# Patient Record
Sex: FEMALE | Race: WHITE | NOT HISPANIC OR LATINO | URBAN - METROPOLITAN AREA
[De-identification: names, ages, dates, MRNs, and addresses within clinical notes are randomized per-mention and may not be internally consistent; named-entity substitution may affect disease eponyms.]

---

## 2020-07-25 ENCOUNTER — TELEPHONE ENCOUNTER (OUTPATIENT)
Dept: URBAN - METROPOLITAN AREA CLINIC 13 | Facility: CLINIC | Age: 75
End: 2020-07-25

## 2020-07-25 RX ORDER — POLYETHYLENE GLYCOL 3350, SODIUM CHLORIDE, SODIUM BICARBONATE AND POTASSIUM CHLORIDE WITH LEMON FLAVOR 420; 11.2; 5.72; 1.48 G/4L; G/4L; G/4L; G/4L
DRINK 32 OUNCES AT 5PM DAY BEFORE PROCEDURE AND 6 HOURS PRIOR POWDER, FOR SOLUTION ORAL
Qty: 1 | Refills: 0 | OUTPATIENT
Start: 2017-10-19 | End: 2018-02-26

## 2020-07-26 ENCOUNTER — TELEPHONE ENCOUNTER (OUTPATIENT)
Dept: URBAN - METROPOLITAN AREA CLINIC 13 | Facility: CLINIC | Age: 75
End: 2020-07-26

## 2020-07-26 RX ORDER — APIXABAN 5 MG/1
TAKE 1 TABLET TWICE DAILY TABLET, FILM COATED ORAL
Refills: 0 | Status: ACTIVE | COMMUNITY
Start: 2019-08-07

## 2020-07-26 RX ORDER — METOPROLOL SUCCINATE 25 MG/1
TAKE 1 TABLET DAILY TABLET, EXTENDED RELEASE ORAL
Refills: 0 | Status: ACTIVE | COMMUNITY

## 2020-07-26 RX ORDER — LEVOTHYROXINE SODIUM 150 UG/1
TAKE 1 TABLET DAILY TABLET ORAL
Refills: 0 | Status: ACTIVE | COMMUNITY

## 2020-07-26 RX ORDER — ESTRADIOL 10 UG/1
INSERT 1 TABLET  TWICE A WEEK INSERT VAGINAL
Refills: 0 | Status: ACTIVE | COMMUNITY

## 2020-07-26 RX ORDER — CEPHALEXIN 500 MG/1
CAPSULE ORAL
Qty: 4 | Refills: 0 | Status: ACTIVE | COMMUNITY
Start: 2019-10-06

## 2021-07-01 ENCOUNTER — TELEPHONE ENCOUNTER (OUTPATIENT)
Dept: URBAN - METROPOLITAN AREA CLINIC 113 | Facility: CLINIC | Age: 76
End: 2021-07-01

## 2021-07-02 ENCOUNTER — OFFICE VISIT (OUTPATIENT)
Dept: URBAN - METROPOLITAN AREA CLINIC 113 | Facility: CLINIC | Age: 76
End: 2021-07-02
Payer: MEDICARE

## 2021-07-02 VITALS
SYSTOLIC BLOOD PRESSURE: 148 MMHG | HEART RATE: 48 BPM | WEIGHT: 184 LBS | HEIGHT: 69 IN | TEMPERATURE: 98 F | BODY MASS INDEX: 27.25 KG/M2 | DIASTOLIC BLOOD PRESSURE: 66 MMHG

## 2021-07-02 DIAGNOSIS — K57.90 DIVERTICULOSIS: ICD-10-CM

## 2021-07-02 DIAGNOSIS — K57.92 DIVERTICULITIS: ICD-10-CM

## 2021-07-02 DIAGNOSIS — R10.30 LOWER ABDOMINAL PAIN: ICD-10-CM

## 2021-07-02 DIAGNOSIS — R19.8 ALTERNATING CONSTIPATION AND DIARRHEA: ICD-10-CM

## 2021-07-02 LAB
A/G RATIO: 2.4
ALBUMIN: 4.6
ALKALINE PHOSPHATASE: 96
AST (SGOT): 22
BASO (ABSOLUTE): 0
BASOS: 0
BILIRUBIN, TOTAL: 0.4
BUN/CREATININE RATIO: 16
BUN: 15
C-REACTIVE PROTEIN, QUANT: 2
CALCIUM: 9.9
CARBON DIOXIDE, TOTAL: 26
CHLORIDE: 102
CREATININE: 0.93
EGFR IF AFRICN AM: 69
EGFR IF NONAFRICN AM: 59
EOS (ABSOLUTE): 0
EOS: 1
GLOBULIN, TOTAL: 1.9
GLUCOSE: 99
HEMATOCRIT: 43
HEMATOLOGY COMMENTS:: (no result)
HEMOGLOBIN: 14.2
IMMATURE CELLS: (no result)
IMMATURE GRANS (ABS): 0
IMMATURE GRANULOCYTES: 0
LYMPHS (ABSOLUTE): 1.1
LYMPHS: 18
MCH: 31.4
MCHC: 33
MCV: 95
MONOCYTES(ABSOLUTE): 0.4
MONOCYTES: 7
NEUTROPHILS (ABSOLUTE): 4.6
NEUTROPHILS: 74
NRBC: (no result)
PLATELETS: 182
POTASSIUM: 4.3
PROTEIN, TOTAL: 6.5
RBC: 4.52
RDW: 12.1
SODIUM: 141
WBC: 6.2

## 2021-07-02 PROCEDURE — 99214 OFFICE O/P EST MOD 30 MIN: CPT | Performed by: INTERNAL MEDICINE

## 2021-07-02 RX ORDER — LORATADINE 10 MG
1 PACKET MIXED WITH 8 OUNCES OF FLUID TABLET,DISINTEGRATING ORAL ONCE A DAY
Qty: 30 | OUTPATIENT
Start: 2021-07-02 | End: 2021-08-01

## 2021-07-02 RX ORDER — LEVOTHYROXINE SODIUM 150 UG/1
TAKE 1 TABLET DAILY TABLET ORAL
Refills: 0 | Status: ACTIVE | COMMUNITY

## 2021-07-02 RX ORDER — CEPHALEXIN 500 MG/1
CAPSULE ORAL
Qty: 4 | Refills: 0 | Status: ACTIVE | COMMUNITY
Start: 2019-10-06

## 2021-07-02 RX ORDER — WHEAT DEXTRIN 3 G/3.5 G
1 TABLESPOON POWDER (GRAM) ORAL DAILY
Qty: 90 | Refills: 1 | OUTPATIENT
Start: 2021-07-02 | End: 2021-12-28

## 2021-07-02 RX ORDER — APIXABAN 5 MG/1
TAKE 1 TABLET TWICE DAILY TABLET, FILM COATED ORAL
Refills: 0 | Status: ACTIVE | COMMUNITY
Start: 2019-08-07

## 2021-07-02 RX ORDER — HYOSCYAMINE SULFATE 0.12 MG/1
1 TABLET UNDER THE TONGUE AND ALLOW TO DISSOLVE  AS NEEDED TABLET, ORALLY DISINTEGRATING ORAL THREE TIMES A DAY
Qty: 40 | Refills: 2 | OUTPATIENT
Start: 2021-07-02 | End: 2021-12-28

## 2021-07-02 RX ORDER — METOPROLOL SUCCINATE 25 MG/1
TAKE 1 TABLET DAILY TABLET, EXTENDED RELEASE ORAL
Refills: 0 | Status: ACTIVE | COMMUNITY

## 2021-07-02 RX ORDER — ESTRADIOL 10 UG/1
_INSERT 1 TABLET  TWICE A WEEK INSERT VAGINAL
Refills: 0 | Status: ON HOLD | COMMUNITY

## 2021-07-02 NOTE — HPI-TODAY'S VISIT:
Ms. Wright Is a 76-year-old female who called the office earlier this week with a possible flare of diverticulitis.  Labs were performed yesterday which revealed a normal CBC, CMP and CRP.  She does have a history of alternating bowel habits with constipation and diarrhea which was treated with fiber and Imodium in the past.  Previous colonoscopy in July 2018 revealed 4 small tubular adenomas ranging in size from 1-2 mm removed from the sigmoid and descending colon and sigmoid diverticulosis.  She was to repeat colonoscopy in 2023, 5 years later.   She has been having intermittent lower abdominal cramps and constipation.  She still alternates with diarrhea every few days.  Last week she had increased abdominal pain that lasted for a few days.  The pain is improved with defecation.  She denies fevers, chills, nausea, vomiting, blood in the stool weight loss or change in bowel habits.  She has never had diverticulitis in the past.  She is still using benefiber almost daily, but does not use any OTC laxatives like miralax.  She has a bowel movement daily, but they are small pebble like for a few days then she will have a normal bowel movement followed by a loose stool.  She has eliminated milk from her diet and limits her other lactose products.   No recent travel, sick contacts or recent antibiotic use.

## 2021-09-01 ENCOUNTER — OFFICE VISIT (OUTPATIENT)
Dept: URBAN - METROPOLITAN AREA CLINIC 113 | Facility: CLINIC | Age: 76
End: 2021-09-01

## 2021-10-04 ENCOUNTER — WEB ENCOUNTER (OUTPATIENT)
Dept: URBAN - METROPOLITAN AREA CLINIC 113 | Facility: CLINIC | Age: 76
End: 2021-10-04

## 2021-10-04 ENCOUNTER — OFFICE VISIT (OUTPATIENT)
Dept: URBAN - METROPOLITAN AREA CLINIC 113 | Facility: CLINIC | Age: 76
End: 2021-10-04
Payer: MEDICARE

## 2021-10-04 VITALS
HEART RATE: 59 BPM | WEIGHT: 179 LBS | TEMPERATURE: 98.2 F | DIASTOLIC BLOOD PRESSURE: 62 MMHG | SYSTOLIC BLOOD PRESSURE: 116 MMHG | BODY MASS INDEX: 26.51 KG/M2 | HEIGHT: 69 IN

## 2021-10-04 DIAGNOSIS — R19.8 ALTERNATING CONSTIPATION AND DIARRHEA: ICD-10-CM

## 2021-10-04 DIAGNOSIS — R10.30 LOWER ABDOMINAL PAIN: ICD-10-CM

## 2021-10-04 DIAGNOSIS — D36.9 TUBULAR ADENOMA: ICD-10-CM

## 2021-10-04 DIAGNOSIS — K57.90 DIVERTICULOSIS: ICD-10-CM

## 2021-10-04 DIAGNOSIS — K57.92 DIVERTICULITIS: ICD-10-CM

## 2021-10-04 PROBLEM — 307496006: Status: ACTIVE | Noted: 2021-07-01

## 2021-10-04 PROBLEM — 397881000: Status: ACTIVE | Noted: 2021-07-02

## 2021-10-04 PROCEDURE — 99213 OFFICE O/P EST LOW 20 MIN: CPT | Performed by: INTERNAL MEDICINE

## 2021-10-04 RX ORDER — CEPHALEXIN 500 MG/1
CAPSULE ORAL
Qty: 4 | Refills: 0 | Status: ACTIVE | COMMUNITY
Start: 2019-10-06

## 2021-10-04 RX ORDER — APIXABAN 5 MG/1
TAKE 1 TABLET TWICE DAILY TABLET, FILM COATED ORAL
Refills: 0 | Status: ACTIVE | COMMUNITY
Start: 2019-08-07

## 2021-10-04 RX ORDER — WHEAT DEXTRIN 3 G/3.5 G
1 TABLESPOON POWDER (GRAM) ORAL DAILY
Qty: 90 | Refills: 1 | OUTPATIENT

## 2021-10-04 RX ORDER — HYOSCYAMINE SULFATE 0.12 MG/1
1 TABLET UNDER THE TONGUE AND ALLOW TO DISSOLVE  AS NEEDED TABLET, ORALLY DISINTEGRATING ORAL THREE TIMES A DAY
Qty: 40 | Refills: 2 | Status: ACTIVE | COMMUNITY
Start: 2021-07-02 | End: 2021-12-28

## 2021-10-04 RX ORDER — LEVOTHYROXINE SODIUM 150 UG/1
TAKE 1 TABLET DAILY TABLET ORAL
Refills: 0 | Status: ACTIVE | COMMUNITY

## 2021-10-04 RX ORDER — HYOSCYAMINE SULFATE 0.12 MG/1
1 TABLET UNDER THE TONGUE AND ALLOW TO DISSOLVE  AS NEEDED TABLET, ORALLY DISINTEGRATING ORAL THREE TIMES A DAY
Qty: 40 | Refills: 2 | OUTPATIENT

## 2021-10-04 RX ORDER — WHEAT DEXTRIN 3 G/3.5 G
1 TABLESPOON POWDER (GRAM) ORAL DAILY
Qty: 90 | Refills: 1 | Status: ACTIVE | COMMUNITY
Start: 2021-07-02 | End: 2021-12-28

## 2021-10-04 RX ORDER — ESTRADIOL 10 UG/1
_INSERT 1 TABLET  TWICE A WEEK INSERT VAGINAL
Refills: 0 | Status: ON HOLD | COMMUNITY

## 2021-10-04 RX ORDER — METOPROLOL SUCCINATE 25 MG/1
TAKE 1 TABLET DAILY TABLET, EXTENDED RELEASE ORAL
Refills: 0 | Status: ACTIVE | COMMUNITY

## 2021-10-04 NOTE — HPI-TODAY'S VISIT:
Ms. Wright Is a 76-year-old female here for office follow-up for evaluation of alternating bowel habits and abdominal pain.  Labs were performed at her last office visit which revealed a normal CBC, CMP and CRP.  Her GI symptoms are much improved.  She is using Benefiber daily and trying to avoid dairy.  She is using Levsin as needed which is very helpful in controlling abdominal cramps.  No further episodes of constipation.  She uses MiraLAX rarely.  She does use Imodium when traveling.  She denies fever, chills, change in bowel habits, blood in the stool or weight loss.  She has noticed fatty foods and lactose increase her diarrhea symptoms.  She has less episodes of diarrhea by avoiding dietary triggers.  Previous colonoscopy in July 2018 revealed 4 small tubular adenomas ranging in size from 1-2 mm removed from the sigmoid and descending colon and sigmoid diverticulosis.  She was to repeat colonoscopy in 2023, 5 years later.

## 2021-10-04 NOTE — PHYSICAL EXAM HENT:
Head, normocephalic, atraumatic, Face, Face within normal limits, Ears, External ears within normal limits, Nose/Nasopharynx, External nose  normal appearance, nares patent, no nasal discharge, Mouth and Throat, Oral cavity appearance normal, Breath odor normal, Lips, Appearance normal 20

## 2022-10-27 ENCOUNTER — ERX REFILL RESPONSE (OUTPATIENT)
Dept: URBAN - METROPOLITAN AREA CLINIC 113 | Facility: CLINIC | Age: 77
End: 2022-10-27

## 2022-10-27 RX ORDER — HYOSCYAMINE SULFATE 0.12 MG/1
DISSOLVE ONE TABLET UNDER THE TONGUE THREE TIMES A DAY AS NEEDED TABLET ORAL
Qty: 40 TABLET | Refills: 0 | OUTPATIENT

## 2022-10-27 RX ORDER — HYOSCYAMINE SULFATE 0.12 MG/1
1 TABLET UNDER THE TONGUE AND ALLOW TO DISSOLVE  AS NEEDED TABLET, ORALLY DISINTEGRATING ORAL THREE TIMES A DAY
Qty: 40 | Refills: 2 | OUTPATIENT

## 2023-05-16 ENCOUNTER — DASHBOARD ENCOUNTERS (OUTPATIENT)
Age: 78
End: 2023-05-16

## 2023-05-16 ENCOUNTER — OFFICE VISIT (OUTPATIENT)
Dept: URBAN - METROPOLITAN AREA CLINIC 113 | Facility: CLINIC | Age: 78
End: 2023-05-16
Payer: MEDICARE

## 2023-05-16 VITALS
HEIGHT: 69 IN | DIASTOLIC BLOOD PRESSURE: 69 MMHG | BODY MASS INDEX: 24.88 KG/M2 | WEIGHT: 168 LBS | TEMPERATURE: 97.8 F | HEART RATE: 48 BPM | SYSTOLIC BLOOD PRESSURE: 154 MMHG | RESPIRATION RATE: 18 BRPM

## 2023-05-16 DIAGNOSIS — D36.9 TUBULAR ADENOMA: ICD-10-CM

## 2023-05-16 DIAGNOSIS — R19.8 ALTERNATING CONSTIPATION AND DIARRHEA: ICD-10-CM

## 2023-05-16 DIAGNOSIS — R10.30 LOWER ABDOMINAL PAIN: ICD-10-CM

## 2023-05-16 DIAGNOSIS — K57.92 DIVERTICULITIS: ICD-10-CM

## 2023-05-16 DIAGNOSIS — K57.90 DIVERTICULOSIS: ICD-10-CM

## 2023-05-16 DIAGNOSIS — K58.0 IRRITABLE BOWEL SYNDROME WITH DIARRHEA: ICD-10-CM

## 2023-05-16 PROBLEM — 197125005: Status: ACTIVE | Noted: 2023-05-16

## 2023-05-16 PROBLEM — 444408007: Status: ACTIVE | Noted: 2023-05-16

## 2023-05-16 PROBLEM — 54586004: Status: ACTIVE | Noted: 2023-05-16

## 2023-05-16 PROCEDURE — 99214 OFFICE O/P EST MOD 30 MIN: CPT | Performed by: INTERNAL MEDICINE

## 2023-05-16 RX ORDER — ESTRADIOL 10 UG/1
_INSERT 1 TABLET  TWICE A WEEK INSERT VAGINAL
Refills: 0 | Status: ON HOLD | COMMUNITY

## 2023-05-16 RX ORDER — METOPROLOL SUCCINATE 25 MG/1
TAKE 1 TABLET DAILY TABLET, EXTENDED RELEASE ORAL
Refills: 0 | Status: ACTIVE | COMMUNITY

## 2023-05-16 RX ORDER — APIXABAN 5 MG/1
TAKE 1 TABLET TWICE DAILY TABLET, FILM COATED ORAL
Refills: 0 | Status: ACTIVE | COMMUNITY
Start: 2019-08-07

## 2023-05-16 RX ORDER — WHEAT DEXTRIN 3 G/3.5 G
1 TABLESPOON POWDER (GRAM) ORAL DAILY
Qty: 90 | Refills: 1 | Status: ACTIVE | COMMUNITY

## 2023-05-16 RX ORDER — LEVOTHYROXINE SODIUM 150 UG/1
TAKE 1 TABLET DAILY TABLET ORAL
Refills: 0 | Status: ACTIVE | COMMUNITY

## 2023-05-16 RX ORDER — HYOSCYAMINE SULFATE 0.12 MG/1
1 TABLET UNDER THE TONGUE AND ALLOW TO DISSOLVE  AS NEEDED TABLET, ORALLY DISINTEGRATING ORAL THREE TIMES A DAY
Qty: 40 | Refills: 2 | OUTPATIENT

## 2023-05-16 RX ORDER — WHEAT DEXTRIN 3 G/3.5 G
1 TABLESPOON POWDER (GRAM) ORAL DAILY
Qty: 90 | Refills: 1 | OUTPATIENT

## 2023-05-16 RX ORDER — CEPHALEXIN 500 MG/1
CAPSULE ORAL
Qty: 4 | Refills: 0 | Status: ACTIVE | COMMUNITY
Start: 2019-10-06

## 2023-05-16 RX ORDER — HYOSCYAMINE SULFATE 0.12 MG/1
DISSOLVE ONE TABLET UNDER THE TONGUE THREE TIMES A DAY AS NEEDED TABLET ORAL
Qty: 40 TABLET | Refills: 0 | Status: ACTIVE | COMMUNITY

## 2023-05-16 NOTE — HPI-TODAY'S VISIT:
Ms. Wright Is a 78-year-old female with a history of irritable bowel syndrome here for routine follow-up of  alternating bowel habits and abdominal pain.  She will typically have a daily BM but then will have a day of having diarrhea.  She has rare episodes of constipation where she will miss a day without a BM.  SHe uses Imodium occasionally if she has to go out.  She only uses the Levsin every few weeks as needed which does relieve the abdominal pain.  She is trying to make dietary changes and eating healthier foods.  She recently read an article regarding irritable bowel syndrome and the gut brain connection.  She denies fever, chills, change in bowel habits, blood in the stool or weight loss.    Previous colonoscopy in July 2018 revealed 4 small tubular adenomas ranging in size from 1-2 mm removed from the sigmoid and descending colon and sigmoid diverticulosis.  She would like to hold off on further colonoscopies if possible.

## 2025-05-27 ENCOUNTER — TELEPHONE ENCOUNTER (OUTPATIENT)
Dept: URBAN - METROPOLITAN AREA CLINIC 113 | Facility: CLINIC | Age: 80
End: 2025-05-27

## 2025-05-27 ENCOUNTER — LAB OUTSIDE AN ENCOUNTER (OUTPATIENT)
Dept: URBAN - METROPOLITAN AREA CLINIC 113 | Facility: CLINIC | Age: 80
End: 2025-05-27

## 2025-05-27 ENCOUNTER — OFFICE VISIT (OUTPATIENT)
Dept: URBAN - METROPOLITAN AREA CLINIC 113 | Facility: CLINIC | Age: 80
End: 2025-05-27
Payer: MEDICARE

## 2025-05-27 DIAGNOSIS — K58.0 IRRITABLE BOWEL SYNDROME WITH DIARRHEA: ICD-10-CM

## 2025-05-27 DIAGNOSIS — R10.32 LEFT LOWER QUADRANT ABDOMINAL PAIN: ICD-10-CM

## 2025-05-27 PROCEDURE — 99214 OFFICE O/P EST MOD 30 MIN: CPT | Performed by: NURSE PRACTITIONER

## 2025-05-27 RX ORDER — ESTRADIOL 10 UG/1
_INSERT 1 TABLET  TWICE A WEEK INSERT VAGINAL
Refills: 0 | Status: ON HOLD | COMMUNITY

## 2025-05-27 RX ORDER — HYOSCYAMINE SULFATE 0.12 MG/1
1 TABLET UNDER THE TONGUE AND ALLOW TO DISSOLVE  AS NEEDED TABLET, ORALLY DISINTEGRATING ORAL THREE TIMES A DAY
Qty: 40 | Refills: 2 | Status: ON HOLD | COMMUNITY

## 2025-05-27 RX ORDER — HYOSCYAMINE SULFATE 0.12 MG/1
DISSOLVE ONE TABLET UNDER THE TONGUE THREE TIMES A DAY AS NEEDED TABLET ORAL
Qty: 40 TABLET | Refills: 0 | Status: ACTIVE | COMMUNITY

## 2025-05-27 RX ORDER — APIXABAN 5 MG/1
TAKE 1 TABLET TWICE DAILY TABLET, FILM COATED ORAL
Refills: 0 | COMMUNITY
Start: 2019-08-07

## 2025-05-27 RX ORDER — METOPROLOL SUCCINATE 25 MG/1
TAKE 1 TABLET DAILY TABLET, EXTENDED RELEASE ORAL
Refills: 0 | Status: ACTIVE | COMMUNITY

## 2025-05-27 RX ORDER — LEVOTHYROXINE SODIUM 150 UG/1
TAKE 1 TABLET DAILY TABLET ORAL
Refills: 0 | Status: ACTIVE | COMMUNITY

## 2025-05-27 RX ORDER — WHEAT DEXTRIN 3 G/3.5 G
1 TABLESPOON POWDER (GRAM) ORAL DAILY
Qty: 90 | Refills: 1 | Status: ACTIVE | COMMUNITY

## 2025-05-27 RX ORDER — HYOSCYAMINE SULFATE 0.12 MG/1
1 TABLET UNDER THE TONGUE AND ALLOW TO DISSOLVE  AS NEEDED TABLET, ORALLY DISINTEGRATING ORAL THREE TIMES A DAY
OUTPATIENT

## 2025-05-27 RX ORDER — CEPHALEXIN 500 MG/1
CAPSULE ORAL
Qty: 4 | Refills: 0 | Status: ACTIVE | COMMUNITY
Start: 2019-10-06

## 2025-05-27 NOTE — HPI-TODAY'S VISIT:
81yo female with a history of hypertension, hyperlipidemia, hypothyroidism, atrial fibrillation s/p Watchman, IBS-D, presenting for urgent evaluation of abdominal pain. This is a same day apointment.  She was previously managed in our office by Dr. Burgos and was last seen by him in May 2023 for follow-up of low abdominal pain and alternating bowel habits attributed to irritable bowel syndrome. She was recommended a daily fiber supplement encouraged a low-FODMAP diet. Hyoscyamine was provided to use as needed.  Her last colonoscopy was performed in 2018 notable for the removal of four 1-2mm tubular adenomas from the sigmoid and descending colon, and sigmoid diverticulosis. She had an unremarkable CBC, CMP, TSH with her PCP in March. Within the last 3 days, she has experienced an exacerbation of mid to left side lower abdominal pain and cramping.  She believes she may have had a low-grade fever on Sunday.  Her bowel habits are fairly regular.  She has intermittent exacerbations of diarrhea which have been attributed to irritable bowel syndrome previously.  This is overall manageable with diet.  She denies any blood in the stool.  She did take hyoscyamine this morning which was somewhat helpful.

## 2025-05-28 LAB
A/G RATIO: 1.8
ABSOLUTE BASOPHILS: 20
ABSOLUTE EOSINOPHILS: 39
ABSOLUTE LYMPHOCYTES: 818
ABSOLUTE MONOCYTES: 441
ABSOLUTE NEUTROPHILS: 3582
ALBUMIN: 4
ALKALINE PHOSPHATASE: 106
ALT (SGPT): 20
AST (SGOT): 22
BASOPHILS: 0.4
BILIRUBIN, TOTAL: 0.5
BUN/CREATININE RATIO: (no result)
BUN: 15
C-REACTIVE PROTEIN, QUANT: 27.6
CALCIUM: 10
CARBON DIOXIDE, TOTAL: 27
CHLORIDE: 104
CREATININE: 0.78
EGFR: 76
EOSINOPHILS: 0.8
GLOBULIN, TOTAL: 2.2
GLUCOSE: 86
HEMATOCRIT: 41.7
HEMOGLOBIN: 13.1
LYMPHOCYTES: 16.7
MCH: 29.7
MCHC: 31.4
MCV: 94.6
MONOCYTES: 9
MPV: 11.8
NEUTROPHILS: 73.1
PLATELET COUNT: 146
POTASSIUM: 4.7
PROTEIN, TOTAL: 6.2
RDW: 11.8
RED BLOOD CELL COUNT: 4.41
SODIUM: 141
WHITE BLOOD CELL COUNT: 4.9

## 2025-05-29 ENCOUNTER — LAB OUTSIDE AN ENCOUNTER (OUTPATIENT)
Dept: URBAN - METROPOLITAN AREA CLINIC 113 | Facility: CLINIC | Age: 80
End: 2025-05-29

## 2025-05-29 ENCOUNTER — TELEPHONE ENCOUNTER (OUTPATIENT)
Dept: URBAN - METROPOLITAN AREA CLINIC 113 | Facility: CLINIC | Age: 80
End: 2025-05-29

## 2025-05-30 ENCOUNTER — OFFICE VISIT (OUTPATIENT)
Dept: URBAN - METROPOLITAN AREA CLINIC 113 | Facility: CLINIC | Age: 80
End: 2025-05-30